# Patient Record
Sex: MALE | Race: WHITE | URBAN - METROPOLITAN AREA
[De-identification: names, ages, dates, MRNs, and addresses within clinical notes are randomized per-mention and may not be internally consistent; named-entity substitution may affect disease eponyms.]

---

## 2018-08-24 ENCOUNTER — TRANSFERRED RECORDS (OUTPATIENT)
Dept: HEALTH INFORMATION MANAGEMENT | Facility: CLINIC | Age: 28
End: 2018-08-24

## 2018-09-25 ENCOUNTER — TRANSFERRED RECORDS (OUTPATIENT)
Dept: HEALTH INFORMATION MANAGEMENT | Facility: CLINIC | Age: 28
End: 2018-09-25

## 2018-11-30 ENCOUNTER — TRANSFERRED RECORDS (OUTPATIENT)
Dept: HEALTH INFORMATION MANAGEMENT | Facility: CLINIC | Age: 28
End: 2018-11-30

## 2019-01-22 ENCOUNTER — OFFICE VISIT (OUTPATIENT)
Dept: FAMILY MEDICINE | Facility: CLINIC | Age: 29
End: 2019-01-22
Payer: MEDICAID

## 2019-01-22 VITALS
HEIGHT: 70 IN | RESPIRATION RATE: 18 BRPM | SYSTOLIC BLOOD PRESSURE: 138 MMHG | BODY MASS INDEX: 27.14 KG/M2 | HEART RATE: 101 BPM | WEIGHT: 189.6 LBS | TEMPERATURE: 97.3 F | OXYGEN SATURATION: 95 % | DIASTOLIC BLOOD PRESSURE: 80 MMHG

## 2019-01-22 DIAGNOSIS — R21 RASH: ICD-10-CM

## 2019-01-22 DIAGNOSIS — J00 ACUTE RHINITIS: Primary | ICD-10-CM

## 2019-01-22 PROCEDURE — 99203 OFFICE O/P NEW LOW 30 MIN: CPT | Performed by: NURSE PRACTITIONER

## 2019-01-22 RX ORDER — FLUTICASONE PROPIONATE 50 MCG
2 SPRAY, SUSPENSION (ML) NASAL DAILY
Qty: 1 BOTTLE | Refills: 3 | Status: SHIPPED | OUTPATIENT
Start: 2019-01-22 | End: 2019-06-17

## 2019-01-22 SDOH — HEALTH STABILITY: MENTAL HEALTH: HOW OFTEN DO YOU HAVE A DRINK CONTAINING ALCOHOL?: NEVER

## 2019-01-22 ASSESSMENT — MIFFLIN-ST. JEOR: SCORE: 1838.33

## 2019-01-22 NOTE — NURSING NOTE
"Chief Complaint   Patient presents with     URI       Initial There were no vitals taken for this visit. Estimated body mass index is 22.43 kg/m  as calculated from the following:    Height as of 6/4/10: 1.759 m (5' 9.25\").    Weight as of 6/4/10: 69.4 kg (153 lb).    Patient presents to the clinic using No DME    Health Maintenance that is potentially due pending provider review:  NONE    n/a    Is there anyone who you would like to be able to receive your results? not asked  If yes have patient fill out BRIGETTE    "

## 2019-01-22 NOTE — PATIENT INSTRUCTIONS
This is most likely a viral rhinitis  Make sure you are getting a lot of rest and fluids  Ibuprofen and/or tylenol for discomfort or fever  Warm salt water gargles 3-4 times a day for sore throat   Start Flonase nasal spray daily (this may take several days to be most effective)  Start Zyrtec daily  Cool mist vaporizer at night   If you can tolerate you can use a nasal saline flush such as the [place] Pot  Sleep with head of bed up at night to promote drainage     Lotion the body     No Antibiotic are warranted at this  time.  It is important if your symptoms worsen or not improved in the next several day return to clinic.

## 2019-01-22 NOTE — PROGRESS NOTES
SUBJECTIVE:   Bandar Bernstein is a 28 year old male who presents to clinic today for the following health issues:      ENT Symptoms             Symptoms: cc Present Absent Comment   Fever/Chills   x    Fatigue  x     Muscle Aches   x    Eye Irritation   x    Sneezing   x    Nasal Jonnie/Drg   x    Sinus Pressure/Pain   x    Loss of smell   x    Dental pain   x    Sore Throat  x  Better   Swollen Glands   x    Ear Pain/Fullness   x    Cough  x  Productive clear    Wheeze   x    Chest Pain  x  With extensive cough   Shortness of breath   x    Rash  x  Abdomen and arms - started this morning    Other         Symptom duration:  9 days   Symptom severity:  moderate   Treatments tried:  mucinex, elderberry - started on Sunday    Contacts:  None         Problem list and histories reviewed & adjusted, as indicated.  Additional history: as documented    Patient Active Problem List   Diagnosis     MELAS syndrome (H)     Past Surgical History:   Procedure Laterality Date     HC CONSTRUCT IM ADHESION/TARSORRHAPH/CANTHOR  2008    eyelid lift     PE TUBES  1997    age 7 bilat ears     TUBES  1997    age 7       Social History     Tobacco Use     Smoking status: Never Smoker     Smokeless tobacco: Never Used   Substance Use Topics     Alcohol use: No     Frequency: Never     Family History   Problem Relation Age of Onset     Thyroid Disease Mother      Arthritis Maternal Grandmother      Osteoporosis Maternal Grandmother      Diabetes Maternal Grandfather      Hypertension Maternal Grandfather      Cardiovascular Maternal Grandfather      Heart Disease Maternal Grandfather          Current Outpatient Medications   Medication Sig Dispense Refill     fluticasone (FLONASE) 50 MCG/ACT nasal spray Spray 2 sprays into both nostrils daily 1 Bottle 3     Coenzyme Q10 (CO Q 10) 10 MG CAPS 1200 mg oral BID 60 capsule 3     COMPOUND (CMPD RX) - PHARMACY TO MIX COMPOUNDED MEDICATION Take 1 teaspoon by mouth twice daily. 300 mL 11     folic  "acid (FOLVITE) 1 MG tablet Take 5 tablets (5 mg) by mouth 2 times daily 100 tablet 3     Thiamine HCl 250 MG TABS Take 250 mg by mouth 2 times daily 60 tablet 3     No Known Allergies    Reviewed and updated as needed this visit by clinical staff  Tobacco  Allergies  Meds  Problems  Med Hx  Surg Hx  Fam Hx  Soc Hx        Reviewed and updated as needed this visit by Provider  Tobacco  Allergies  Meds  Problems  Med Hx  Surg Hx  Fam Hx  Soc Hx          ROS:  Constitutional, HEENT, cardiovascular, pulmonary, gi and gu systems are negative, except as otherwise noted.    OBJECTIVE:     /80   Pulse 101   Temp 97.3  F (36.3  C)   Resp 18   Ht 1.765 m (5' 9.5\")   Wt 86 kg (189 lb 9.6 oz)   SpO2 95%   BMI 27.60 kg/m    Body mass index is 27.6 kg/m .  GENERAL APPEARANCE: healthy, alert and no distress  EYES: Eyes grossly normal to inspection, PERRL and conjunctivae and sclerae normal  HENT: ear canals and TM's normal, nose and mouth without ulcers or lesions and nasal mucosa edematous without rhinorrhea  NECK: no adenopathy, no asymmetry, masses, or scars and thyroid normal to palpation  RESP: lungs clear to auscultation - no rales, rhonchi or wheezes, sporadic dry cough, no respiratory distress, oxygen saturations 95% on room air  CV: normal S1 S2, no S3 or S4, no murmur, click or rub and tachycardia -baseline for patient, follows cardiology  ABDOMEN: soft, nontender, without hepatosplenomegaly or masses and bowel sounds normal  MS: extremities normal- no gross deformities noted  SKIN: no suspicious lesions very light maculopapular rash on left and right flank, dry skin   NEURO: Normal strength and tone, mentation intact and speech normal    Diagnostic Test Results:  none     ASSESSMENT/PLAN:     1. Acute rhinitis  Patient presents with 9-day history of dry cough, some sore throat which is now resolved and new rash that started this morning.  Symptoms and exam are consistent with rhinitis, lungs " sound clear oxygen saturations stable on room air.  Would like to start patient on nasal steroid spray along with other supportive cares including over-the-counter antihistamine.  Given patient's history of MELAS, close monitoring advised to return to clinic if symptoms worsening or not improving.  - fluticasone (FLONASE) 50 MCG/ACT nasal spray; Spray 2 sprays into both nostrils daily  Dispense: 1 Bottle; Refill: 3      2. Rash  New maculopapular rash onset this morning, no new exposures.  Rash consistent with viral rash.  Continued monitoring and advised use of lotion for dry skin.        Patient Instructions   This is most likely a viral rhinitis  Make sure you are getting a lot of rest and fluids  Ibuprofen and/or tylenol for discomfort or fever  Warm salt water gargles 3-4 times a day for sore throat   Start Flonase nasal spray daily (this may take several days to be most effective)  Start Zyrtec daily  Cool mist vaporizer at night   If you can tolerate you can use a nasal saline flush such as the [place] Pot  Sleep with head of bed up at night to promote drainage     Lotion the body     No Antibiotic are warranted at this  time.  It is important if your symptoms worsen or not improved in the next several day return to clinic.           PAULINE Soria CNP  [facility]

## 2019-02-06 ENCOUNTER — TELEPHONE (OUTPATIENT)
Dept: FAMILY MEDICINE | Facility: CLINIC | Age: 29
End: 2019-02-06

## 2019-02-06 NOTE — TELEPHONE ENCOUNTER
Received incoming records from [facility]. Given to Alpa Rasmussen to review.    [facility]

## 2019-06-17 ENCOUNTER — OFFICE VISIT (OUTPATIENT)
Dept: FAMILY MEDICINE | Facility: CLINIC | Age: 29
End: 2019-06-17
Payer: MEDICAID

## 2019-06-17 VITALS
OXYGEN SATURATION: 96 % | SYSTOLIC BLOOD PRESSURE: 118 MMHG | RESPIRATION RATE: 18 BRPM | WEIGHT: 184.6 LBS | DIASTOLIC BLOOD PRESSURE: 78 MMHG | BODY MASS INDEX: 26.87 KG/M2 | HEART RATE: 134 BPM | TEMPERATURE: 99.1 F

## 2019-06-17 DIAGNOSIS — R05.9 COUGH: ICD-10-CM

## 2019-06-17 DIAGNOSIS — J02.9 SORE THROAT: ICD-10-CM

## 2019-06-17 DIAGNOSIS — J00 ACUTE RHINITIS: Primary | ICD-10-CM

## 2019-06-17 LAB
DEPRECATED S PYO AG THROAT QL EIA: NORMAL
SPECIMEN SOURCE: NORMAL

## 2019-06-17 PROCEDURE — 87081 CULTURE SCREEN ONLY: CPT | Performed by: NURSE PRACTITIONER

## 2019-06-17 PROCEDURE — 87880 STREP A ASSAY W/OPTIC: CPT | Performed by: NURSE PRACTITIONER

## 2019-06-17 PROCEDURE — 99213 OFFICE O/P EST LOW 20 MIN: CPT | Performed by: NURSE PRACTITIONER

## 2019-06-17 RX ORDER — BENZONATATE 200 MG/1
100-200 CAPSULE ORAL 3 TIMES DAILY PRN
Qty: 42 CAPSULE | Refills: 0 | Status: SHIPPED | OUTPATIENT
Start: 2019-06-17 | End: 2019-06-24

## 2019-06-17 NOTE — LETTER
[address] [address]  [phone]          June 17, 2019    Bandar Bernstein                                                                                                                     [address] [place] [address]          To Whom it May Concern,    Bandar was seen in clinic today 6/17/2019 and is under this providers general care. Due to current illness and compromised health status, exposures at current occupation pose increased risk to patient's health and is requested that he not return to work until his last date provided in notice.         Sincerely,     PAULINE Tadeo CNP

## 2019-06-17 NOTE — NURSING NOTE
"Chief Complaint   Patient presents with     Cough       Initial /78   Pulse 134   Temp 99.1  F (37.3  C) (Tympanic)   Resp 18   Wt 83.7 kg (184 lb 9.6 oz)   SpO2 96%   BMI 26.87 kg/m   Estimated body mass index is 26.87 kg/m  as calculated from the following:    Height as of 1/22/19: 1.765 m (5' 9.5\").    Weight as of this encounter: 83.7 kg (184 lb 9.6 oz).    Patient presents to the clinic using No DME    Health Maintenance that is potentially due pending provider review:   1992  PREVENTIVE CARE VISIT     09/02/2003  DTAP/TDAP/TD IMMUNIZATION (6 - Tdap)     09/02/2007  HIV SCREENING              Will discuss with provider.    Is there anyone who you would like to be able to receive your results? No  If yes have patient fill out BRIGETTE      "

## 2019-06-17 NOTE — LETTER
June 18, 2019      Bandar Bernstein  [address]  [place] [address]        Dear Bandar,       The results of your 24 hour throat culture were negative. Please contact your clinic if you have any questions or concerns.      Sincerely,        PAULINE Soria CNP

## 2019-06-17 NOTE — PROGRESS NOTES
Subjective     Bandar Bernstein is a 28 year old male who presents to clinic today for the following health issues:    HPI   RESPIRATORY SYMPTOMS      Duration: 4 days    Description  nasal congestion, rhinorrhea, sore throat, cough, fever, fatigue/malaise and conjunctival irritation    Severity: mild    Accompanying signs and symptoms: None    History (predisposing factors):  strep exposure maybe    Precipitating or alleviating factors: None    Therapies tried and outcome:  rest and fluids acetaminophen ibuprofen, sudafed     Runny nose, no post nasal drainage   Productive cough   Temperature max at home around 100  No shortness of breath, chest pain or wheezing   Sore throat hurts to the point of not drinking or eating     Started working at a  just this last week       Patient Active Problem List   Diagnosis     MELAS syndrome (H)     Past Surgical History:   Procedure Laterality Date     HC CONSTRUCT IM ADHESION/TARSORRHAPH/CANTHOR  2008    eyelid lift     PE TUBES  1997    age 7 bilat ears     TUBES  1997    age 7       Social History     Tobacco Use     Smoking status: Never Smoker     Smokeless tobacco: Never Used   Substance Use Topics     Alcohol use: No     Frequency: Never     Family History   Problem Relation Age of Onset     Thyroid Disease Mother      Arthritis Maternal Grandmother      Osteoporosis Maternal Grandmother      Diabetes Maternal Grandfather      Hypertension Maternal Grandfather      Cardiovascular Maternal Grandfather      Heart Disease Maternal Grandfather          Current Outpatient Medications   Medication Sig Dispense Refill     benzonatate (TESSALON) 200 MG capsule Take 0.5-1 capsules (100-200 mg) by mouth 3 times daily as needed for cough 42 capsule 0     Coenzyme Q10 (CO Q 10) 10 MG CAPS 1200 mg oral BID 60 capsule 3     COMPOUND (CMPD RX) - PHARMACY TO MIX COMPOUNDED MEDICATION Take 1 teaspoon by mouth twice daily. 300 mL 11     folic acid (FOLVITE) 1 MG tablet Take 5  tablets (5 mg) by mouth 2 times daily 100 tablet 3     Thiamine HCl 250 MG TABS Take 250 mg by mouth 2 times daily 60 tablet 3     No Known Allergies      Reviewed and updated as needed this visit by Provider  Tobacco  Allergies  Meds  Problems  Med Hx  Surg Hx  Fam Hx  Soc Hx          Review of Systems   ROS COMP: Constitutional, HEENT, cardiovascular, pulmonary, gi and gu systems are negative, except as otherwise noted.      Objective    /78   Pulse 134   Temp 99.1  F (37.3  C) (Tympanic)   Resp 18   Wt 83.7 kg (184 lb 9.6 oz)   SpO2 96%   BMI 26.87 kg/m    Body mass index is 26.87 kg/m .  Physical Exam   GENERAL APPEARANCE: healthy, alert and fatigued  EYES: Eyes grossly normal to inspection, PERRL and conjunctivae and sclerae normal  HENT: ear canals normal and TM's with serous fluid bilateral and nose and mouth without ulcers or lesions  NECK: no adenopathy, no asymmetry, masses, or scars and thyroid normal to palpation  RESP: lungs clear to auscultation - no rales, rhonchi or wheezes  CV: regular rates and rhythm, normal S1 S2, no S3 or S4 and no murmur, click or rub  MS: extremities normal- no gross deformities noted  SKIN: no suspicious lesions or rashes  NEURO: Normal strength and tone, mentation intact and speech normal  PSYCH: mentation appears normal and affect normal/bright    Diagnostic Test Results:  Results for orders placed or performed in visit on 06/17/19   Strep, Rapid Screen   Result Value Ref Range    Specimen Description Throat     Rapid Strep A Screen       NEGATIVE: No Group A streptococcal antigen detected by immunoassay, await culture report.   Beta strep group A culture   Result Value Ref Range    Specimen Description Throat     Culture Micro No beta hemolytic Streptococcus Group A isolated            Assessment & Plan     1. Acute rhinitis  Acute, no fever. Symptoms x 4 days. Rapid strep negative, will await culture. Start over the counter anti-histamine, supportive  "cares, would advise with immunocompromised status would not work at . Advised to return to clinic if symptoms worsening or not improving.     2. Cough    - benzonatate (TESSALON) 200 MG capsule; Take 0.5-1 capsules (100-200 mg) by mouth 3 times daily as needed for cough  Dispense: 42 capsule; Refill: 0    3. Sore throat    - Strep, Rapid Screen  - Beta strep group A culture     BMI:   Estimated body mass index is 26.87 kg/m  as calculated from the following:    Height as of 1/22/19: 1.765 m (5' 9.5\").    Weight as of this encounter: 83.7 kg (184 lb 9.6 oz).   Weight management plan: Discussed healthy diet and exercise guidelines        Patient Instructions   Rapid strep negative - await culture     This is most likely a viral versus allergic   Make sure you are getting a lot of rest and fluids  Ibuprofen and/or tylenol for discomfort or fever  Warm salt water gargles 3-4 times a day for sore throat   Start over the counter Claritin or Zyrtec daily and can continue a couple more days of the Sudafed   Tessalon was ordered for cough and mucous - can take up to three times daily   Cool mist vaporizer at night   If you can tolerate you can use a nasal saline flush such as the [place] Pot  Sleep with head of bed up at night to promote drainage     No Antibiotic are warranted at this  time.  If symptoms are not improving or worsening by Friday please see me back.             Return in about 4 days (around 6/21/2019), or if symptoms worsen or fail to improve.    PAULINE Soria [facility]        "

## 2019-06-17 NOTE — PATIENT INSTRUCTIONS
Rapid strep negative - await culture     This is most likely a viral versus allergic   Make sure you are getting a lot of rest and fluids  Ibuprofen and/or tylenol for discomfort or fever  Warm salt water gargles 3-4 times a day for sore throat   Start over the counter Claritin or Zyrtec daily and can continue a couple more days of the Sudafed   Tessalon was ordered for cough and mucous - can take up to three times daily   Cool mist vaporizer at night   If you can tolerate you can use a nasal saline flush such as the [place] Pot  Sleep with head of bed up at night to promote drainage     No Antibiotic are warranted at this  time.  If symptoms are not improving or worsening by Friday please see me back.

## 2019-06-18 LAB
BACTERIA SPEC CULT: NORMAL
SPECIMEN SOURCE: NORMAL